# Patient Record
Sex: FEMALE | Race: OTHER
[De-identification: names, ages, dates, MRNs, and addresses within clinical notes are randomized per-mention and may not be internally consistent; named-entity substitution may affect disease eponyms.]

---

## 2019-09-28 ENCOUNTER — HOSPITAL ENCOUNTER (EMERGENCY)
Dept: HOSPITAL 54 - ER | Age: 29
Discharge: HOME | End: 2019-09-28
Payer: COMMERCIAL

## 2019-09-28 VITALS — WEIGHT: 190 LBS | BODY MASS INDEX: 31.65 KG/M2 | HEIGHT: 65 IN

## 2019-09-28 VITALS — DIASTOLIC BLOOD PRESSURE: 77 MMHG | SYSTOLIC BLOOD PRESSURE: 120 MMHG

## 2019-09-28 DIAGNOSIS — F41.9: ICD-10-CM

## 2019-09-28 DIAGNOSIS — S61.213A: Primary | ICD-10-CM
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
## 2019-09-28 DIAGNOSIS — Z98.890: ICD-10-CM

## 2019-09-28 NOTE — NUR
PT BIBFRIEND C/C L MIDDLE FINGER LAC X4 HRS AGO, FINGER GOT STUCK IN WHEELCHAIR 
AT [facility]. NO MEDS. PT AOX4. NAD NOTED. RESP EVEN AND UNLABORED. NO 
BLEEDING NOTED. PT WAS GIVEN ANTIBIOTIC OINTMENT FROM NURSE AT [facility]. PT ON MONITOR IN BED 3 WITH FRIEND AT BEDSIDE. WILL CONTINUE TO 
MONITOR.